# Patient Record
Sex: FEMALE | Race: WHITE
[De-identification: names, ages, dates, MRNs, and addresses within clinical notes are randomized per-mention and may not be internally consistent; named-entity substitution may affect disease eponyms.]

---

## 2019-07-17 ENCOUNTER — HOSPITAL ENCOUNTER (EMERGENCY)
Dept: HOSPITAL 91 - FTE | Age: 39
Discharge: HOME | End: 2019-07-17
Payer: COMMERCIAL

## 2019-07-17 ENCOUNTER — HOSPITAL ENCOUNTER (EMERGENCY)
Dept: HOSPITAL 10 - FTE | Age: 39
Discharge: HOME | End: 2019-07-17
Payer: COMMERCIAL

## 2019-07-17 VITALS — HEART RATE: 95 BPM | RESPIRATION RATE: 20 BRPM | DIASTOLIC BLOOD PRESSURE: 80 MMHG | SYSTOLIC BLOOD PRESSURE: 118 MMHG

## 2019-07-17 VITALS — WEIGHT: 145.31 LBS | HEIGHT: 55 IN | BODY MASS INDEX: 33.63 KG/M2

## 2019-07-17 DIAGNOSIS — N95.1: Primary | ICD-10-CM

## 2019-07-17 LAB
ADD MAN DIFF?: NO
ADD UMIC: YES
ALANINE AMINOTRANSFERASE: 14 IU/L (ref 13–69)
ALBUMIN/GLOBULIN RATIO: 1.24
ALBUMIN: 4.6 G/DL (ref 3.3–4.9)
ALKALINE PHOSPHATASE: 60 IU/L (ref 42–121)
ANION GAP: 11 (ref 5–13)
ASPARTATE AMINO TRANSFERASE: 18 IU/L (ref 15–46)
BASOPHIL #: 0 10^3/UL (ref 0–0.1)
BASOPHILS %: 0.6 % (ref 0–2)
BILIRUBIN,DIRECT: 0 MG/DL (ref 0–0.2)
BILIRUBIN,TOTAL: 1.4 MG/DL (ref 0.2–1.3)
BLOOD UREA NITROGEN: 4 MG/DL (ref 7–20)
CALCIUM: 10.1 MG/DL (ref 8.4–10.2)
CARBON DIOXIDE: 27 MMOL/L (ref 21–31)
CHLORIDE: 106 MMOL/L (ref 97–110)
CREATININE: 0.57 MG/DL (ref 0.44–1)
EOSINOPHILS #: 0.1 10^3/UL (ref 0–0.5)
EOSINOPHILS %: 1.1 % (ref 0–7)
GLOBULIN: 3.7 G/DL (ref 1.3–3.2)
GLUCOSE: 100 MG/DL (ref 70–220)
HEMATOCRIT: 38.3 % (ref 37–47)
HEMOGLOBIN: 12.5 G/DL (ref 12–16)
IMMATURE GRANS #M: 0.02 10^3/UL (ref 0–0.03)
IMMATURE GRANS % (M): 0.3 % (ref 0–0.43)
LIPASE: 72 U/L (ref 23–300)
LYMPHOCYTES #: 1.9 10^3/UL (ref 0.8–2.9)
LYMPHOCYTES %: 29.2 % (ref 15–51)
MEAN CORPUSCULAR HEMOGLOBIN: 29.3 PG (ref 29–33)
MEAN CORPUSCULAR HGB CONC: 32.6 G/DL (ref 32–37)
MEAN CORPUSCULAR VOLUME: 89.9 FL (ref 82–101)
MEAN PLATELET VOLUME: 9.9 FL (ref 7.4–10.4)
MONOCYTE #: 0.3 10^3/UL (ref 0.3–0.9)
MONOCYTES %: 5.1 % (ref 0–11)
NEUTROPHIL #: 4.1 10^3/UL (ref 1.6–7.5)
NEUTROPHILS %: 63.7 % (ref 39–77)
NUCLEATED RED BLOOD CELLS #: 0 10^3/UL (ref 0–0)
NUCLEATED RED BLOOD CELLS%: 0 /100WBC (ref 0–0)
PLATELET COUNT: 229 10^3/UL (ref 140–415)
POTASSIUM: 3.8 MMOL/L (ref 3.5–5.1)
RED BLOOD COUNT: 4.26 10^6/UL (ref 4.2–5.4)
RED CELL DISTRIBUTION WIDTH: 12.1 % (ref 11.5–14.5)
SODIUM: 144 MMOL/L (ref 135–144)
TOTAL PROTEIN: 8.3 G/DL (ref 6.1–8.1)
UR ASCORBIC ACID: NEGATIVE MG/DL
UR BILIRUBIN (DIP): NEGATIVE MG/DL
UR BLOOD (DIP): NEGATIVE MG/DL
UR CLARITY: (no result)
UR COLOR: YELLOW
UR GLUCOSE (DIP): NEGATIVE MG/DL
UR KETONES (DIP): NEGATIVE MG/DL
UR LEUKOCYTE ESTERASE (DIP): (no result) LEU/UL
UR MUCUS: (no result) /HPF
UR NITRITE (DIP): NEGATIVE MG/DL
UR PH (DIP): 5 (ref 5–9)
UR RBC: 2 /HPF (ref 0–5)
UR SPECIFIC GRAVITY (DIP): 1.01 (ref 1–1.03)
UR SQUAMOUS EPITHELIAL CELL: (no result) /HPF
UR TOTAL PROTEIN (DIP): NEGATIVE MG/DL
UR UROBILINOGEN (DIP): NEGATIVE MG/DL
UR WBC: 9 /HPF (ref 0–5)
WHITE BLOOD COUNT: 6.5 10^3/UL (ref 4.8–10.8)

## 2019-07-17 PROCEDURE — 81025 URINE PREGNANCY TEST: CPT

## 2019-07-17 PROCEDURE — 80053 COMPREHEN METABOLIC PANEL: CPT

## 2019-07-17 PROCEDURE — 36415 COLL VENOUS BLD VENIPUNCTURE: CPT

## 2019-07-17 PROCEDURE — 85025 COMPLETE CBC W/AUTO DIFF WBC: CPT

## 2019-07-17 PROCEDURE — 81001 URINALYSIS AUTO W/SCOPE: CPT

## 2019-07-17 PROCEDURE — 83690 ASSAY OF LIPASE: CPT

## 2019-07-17 PROCEDURE — 99283 EMERGENCY DEPT VISIT LOW MDM: CPT

## 2019-07-17 NOTE — ERD
ER Documentation


Chief Complaint


Chief Complaint





cc "hot flashes and sob"





HPI


38-year-old female presenting with hot flashes and shortness of breath.  Patient


states that she denies any chest pain.  Denies coughing.  LNMP 3 months ago.  Is


not sure if she is pregnant.  G6, .  Denies medical problems.  Irregular.  


OC denies OCP use.  Patient is requesting OCP to initiate her period.  Denies 


medical problems.  NKDA.  Surgical history on her nose.  Social history denies





ROS


All systems reviewed and are negative except as per history of present illness.





Medications


Home Meds


Active Scripts


Norethindrone AC-Eth Estradiol (Loestrin 21 1-20 Tablet) 1 Each Tablet, 1 EACH P


O DAILY, #30 TAB


   Prov:AILYN LINTON PA-C         19


Acetaminophen* (Tylenol*) 325 Mg Tablet, 2 TAB PO Q6 PRN for PAIN AND OR 


ELEVATED TEMP, #20 TAB


   Prov:AILYN LINTON PA-C         19


Ondansetron (Ondansetron Odt) 4 Mg Tab.rapdis, 4 MG PO Q6H PRN for NAUSEA AND/OR


VOMITING, #10 TAB


   Prov:AILYN LINTON PA-C         19





Allergies


Allergies:  


Coded Allergies:  


     No Known Allergy (Unverified , 19)





PMhx/Soc


Medical and Surgical Hx:  pt denies Medical Hx, pt denies Surgical Hx


History of Surgery:  No


Hx Neurological Disorder:  No


Hx Respiratory Disorders:  No


Hx Cardiac Disorders:  No


Hx Psychiatric Problems:  No


Hx Miscellaneous Medical Probl:  No


Hx Alcohol Use:  No


Hx Substance Use:  No


Hx Tobacco Use:  No


Smoking Status:  Never smoker





FmHx


Family History:  No diabetes, No coronary disease, No other





Physical Exam


Vitals





Vital Signs


  Date      Temp  Pulse  Resp  B/P (MAP)   Pulse Ox  O2          O2 Flow    FiO2


Time                                                 Delivery    Rate


   19  98.0     95    20      118/80       100


     08:41                           (93)





Physical Exam


GENERAL: The patient is well-appearing, well-nourished, in no acute distres


CHEST: Clear to auscultation bilaterally.  There are no rales, wheezes or 


rhonchi.


HEART: Regular rate and rhythm.  No murmurs, clicks, rubs or gallops.  No S3 or 


S4.


ABDOMEN:Soft, nontender and nondistended.  Good bowel sounds.  No rebound or 


guarding.  No gross peritonitis.  No gross organomegaly or masses.  


NEUROLOGIC: Alert and oriented.  Cranial nerves II through XII intact.  Motor 


strength in all 4 extremities with 5 out of 5 strength.  Sensation grossly 


intact.  Normal speech and gait. 


SKIN: There is no apparent rash or petechiae.  The skin is warm and dry.


Result Diagram:  


19





Results 24 hrs





Laboratory Tests


Test
                               19
09:26   19
09:30  19
09:32


Urine Color                       YELLOW


Urine Clarity
                    SLIGHTLY
CLOUDY  
               



Urine pH                                     5.0


Urine Specific Gravity                     1.012


Urine Ketones                     NEGATIVE mg/dL


Urine Nitrite                     NEGATIVE mg/dL


Urine Bilirubin                   NEGATIVE mg/dL


Urine Urobilinogen                NEGATIVE mg/dL


Urine Leukocyte Esterase               1+ Clark/ul


Urine Microscopic RBC                     2 /HPF


Urine Microscopic WBC                     9 /HPF


Urine Squamous Epithelial
Cells   FEW /HPF 
       
               



Urine Mucus                       MODERATE /HPF


Urine Hemoglobin                  NEGATIVE mg/dL


Urine Glucose                     NEGATIVE mg/dL


Urine Total Protein               NEGATIVE mg/dl


White Blood Count                                    6.5 10^3/ul


Red Blood Count                                     4.26 10^6/ul


Hemoglobin                                             12.5 g/dl


Hematocrit                                                38.3 %


Mean Corpuscular Volume                                  89.9 fl


Mean Corpuscular Hemoglobin                              29.3 pg


Mean Corpuscular                  
                   32.6 g/dl 
  



Hemoglobin
Concent


Red Cell Distribution Width                               12.1 %


Platelet Count                                       229 10^3/UL


Mean Platelet Volume                                      9.9 fl


Immature Granulocytes %                                  0.300 %


Neutrophils %                                             63.7 %


Lymphocytes %                                             29.2 %


Monocytes %                                                5.1 %


Eosinophils %                                              1.1 %


Basophils %                                                0.6 %


Nucleated Red Blood Cells %                          0.0 /100WBC


Immature Granulocytes #                            0.020 10^3/ul


Neutrophils #                                        4.1 10^3/ul


Lymphocytes #                                        1.9 10^3/ul


Monocytes #                                          0.3 10^3/ul


Eosinophils #                                        0.1 10^3/ul


Basophils #                                          0.0 10^3/ul


Nucleated Red Blood Cells #                          0.0 10^3/ul


Sodium Level                                          144 mmol/L


Potassium Level                                       3.8 mmol/L


Chloride Level                                        106 mmol/L


Carbon Dioxide Level                                   27 mmol/L


Anion Gap                                                     11


Blood Urea Nitrogen                                      4 mg/dl


Creatinine                                            0.57 mg/dl


Est Glomerular Filtrat            
                > 60 mL/min 
   



Rate
mL/min


Glucose Level                                          100 mg/dl


Calcium Level                                         10.1 mg/dl


Total Bilirubin                                        1.4 mg/dl


Direct Bilirubin                                      0.00 mg/dl


Indirect Bilirubin                                     1.4 mg/dl


Aspartate Amino                   
                     18 IU/L 
  



Transf
(AST/SGOT)


Alanine                           
                     14 IU/L 
  



Aminotransferase
(ALT/SGPT)


Alkaline Phosphatase                                     60 IU/L


Total Protein                                           8.3 g/dl


Albumin                                                 4.6 g/dl


Globulin                                               3.70 g/dl


Albumin/Globulin Ratio                                      1.24


Lipase                                                    72 U/L


POC Beta HCG, Qualitative                                          NEGATIVE








Procedures/MDM


MDM: 38-year-old female presenting with questions.  I have low suspicion for 


cardiac or pulmonary emergency.  I have low suspicion for elective imbalance.  A


low suspicion for urinary tract infection.  I have low suspicion for pelvic or 


abdominal complication.  Patient is discharged with strict ER precautions and 


told to follow-up with primary care within 1 to 2 days for close evaluation.  


Patient is told symptoms change or worsen to return immediately to the ER.  All 


questions answered at discharge





Departure


Diagnosis:  


   Primary Impression:  


   Hot flashes


Condition:  Stable


Patient Instructions:  Abdominal Pain, Unknown Cause, (Female)


Referrals:  


[facility]


YOU HAVE RECEIVED A MEDICAL SCREENING EXAM AND THE RESULTS INDICATE THAT YOU DO 


NOT HAVE A CONDITION THAT REQUIRES URGENT TREATMENT IN THE EMERGENCY DEPARTMENT.





FURTHER EVALUATION AND TREATMENT OF YOUR CONDITION CAN WAIT UNTIL YOU ARE SEEN 


IN YOUR DOCTORS OFFICE WITHIN THE NEXT 1-2 DAYS. IT IS YOUR RESPONSIBILITY TO 


MAKE AN APPOINTMENT FOR FOLOW-UP CARE.





IF YOU HAVE A PRIMARY DOCTOR


--you should call your primary doctor and schedule an appointment





IF YOU DO NOT HAVE A PRIMARY DOCTOR YOU CAN CALL OUR PHYSICIAN REFERRAL HOTLINE 


AT


 [phone] 





IF YOU CAN NOT AFFORD TO SEE A PHYSICIAN YOU CAN CHOSE FROM THE FOLLOWING 


[facility]





[facility] [phone] [address] [facility] [phone] [address] [facility]. [facility] [phone] [facility]. [facility] [phone] [address] [facility]. [facility] [phone] [address] [facility]. [facility] [phone]


[address]





Additional Instructions:  


FOLLOW UP WITH YOUR PRIMARY CARE PHYSICIAN TOMORROW.Return to this facility if 


you are not improving as expected.











AILYN LINTON PA-C       2019 14:37

## 2019-08-04 ENCOUNTER — HOSPITAL ENCOUNTER (EMERGENCY)
Dept: HOSPITAL 10 - FTE | Age: 39
Discharge: HOME | End: 2019-08-04
Payer: COMMERCIAL

## 2019-08-04 ENCOUNTER — HOSPITAL ENCOUNTER (EMERGENCY)
Dept: HOSPITAL 91 - FTE | Age: 39
Discharge: HOME | End: 2019-08-04
Payer: COMMERCIAL

## 2019-08-04 VITALS
BODY MASS INDEX: 23.95 KG/M2 | WEIGHT: 149.03 LBS | HEIGHT: 66 IN | WEIGHT: 149.03 LBS | BODY MASS INDEX: 23.95 KG/M2 | HEIGHT: 66 IN

## 2019-08-04 VITALS — HEART RATE: 72 BPM | DIASTOLIC BLOOD PRESSURE: 69 MMHG | SYSTOLIC BLOOD PRESSURE: 110 MMHG | RESPIRATION RATE: 18 BRPM

## 2019-08-04 DIAGNOSIS — R23.2: Primary | ICD-10-CM

## 2019-08-04 PROCEDURE — 99282 EMERGENCY DEPT VISIT SF MDM: CPT

## 2019-08-04 PROCEDURE — 81025 URINE PREGNANCY TEST: CPT

## 2019-08-04 NOTE — ERD
ER Documentation


Chief Complaint


Chief Complaint





abdominal pain with hot flushes since on and off since may 16,2019.





HPI


Patient is a 38 years old female with no known past medical history presenting 


to the clinic for persistent hot flashes and mild abdominal cramps occasional 


nausea and vomiting since May 16, 2019.  She was seen and evaluated for similar 


symptoms on July 17, 2019.  Patient was given OCP and was advised to follow-up 


with her primary care and OB/GYN.  She denies following up with her PCP or 


OB/GYN.  Patient is requesting urine pregnancy test that she is still concerned 


about possible pregnancy.





ROS


All systems reviewed and are negative except as per history of present illness.





Medications


Home Meds


Active Scripts


Norethindrone AC-Eth Estradiol (Loestrin 21 1-20 Tablet) 1 Each Tablet, 1 EACH 


PO DAILY, #30 TAB


   Prov:AILYN LINTON PA-C         7/17/19


Acetaminophen* (Tylenol*) 325 Mg Tablet, 2 TAB PO Q6 PRN for PAIN AND OR 


ELEVATED TEMP, #20 TAB


   Prov:AILYN LINTON PA-C         7/17/19


Ondansetron (Ondansetron Odt) 4 Mg Tab.rapdis, 4 MG PO Q6H PRN for NAUSEA AND/OR


VOMITING, #10 TAB


   Prov:AILYN LINTON PA-C         7/17/19





Allergies


Allergies:  


Coded Allergies:  


     No Known Allergy (Unverified , 7/17/19)





PMhx/Soc


Medical and Surgical Hx:  pt denies Medical Hx, pt denies Surgical Hx


History of Surgery:  No


Hx Neurological Disorder:  No


Hx Respiratory Disorders:  No


Hx Cardiac Disorders:  No


Hx Psychiatric Problems:  No


Hx Miscellaneous Medical Probl:  No


Hx Alcohol Use:  No


Hx Substance Use:  No


Hx Tobacco Use:  No


Smoking Status:  Never smoker





Physical Exam


Vitals





Vital Signs


  Date      Temp  Pulse  Resp  B/P (MAP)   Pulse Ox  O2          O2 Flow    FiO2


Time                                                 Delivery    Rate


    8/4/19  98.7     72    18      110/69        99


     02:04                           (83)


    8/4/19  97.7     79    16      140/80        99


     00:23                          (100)





Physical Exam


Const:   No acute distress


Head:   Atraumatic 


Resp:   Clear to auscultation bilaterally


Cardio:   Regular rate and rhythm, no murmurs


Abd:    Soft, non tender, non distended. Normal bowel sounds


Psych:    Normal Mood and Affect


Results 24 hrs





Laboratory Tests


                    Test
                      8/4/19
01:51


                    POC Beta HCG, Qualitative  NEGATIVE








Procedures/MDM


Patient was seen and evaluated for persistent hot flashes.  Patient currently 


denies any vomiting or nausea episodes as of today not require any further work-


up.





Urine pregnancy negative.





Patient is stable ready for discharge.  Follow-up with your PCP and OB/GYN.





Departure


Diagnosis:  


   Primary Impression:  


   Hot flashes


Condition:  Stable


Patient Instructions:  For Women: Deciding About Hormone Therapy (HT)


Referrals:  


[facility]





Additional Instructions:  


Patient advised to return to the ED immediately for new or worsening symptoms. 


Patient advised to follow up with primary care provider in the next 24-48 hours.


Patient verbalized understanding and agrees with treatment plan and course of 


action.














If patient has no primary care they may follow up with














[facility]





[address] [place] [id number]














or














[facility]





[id number] [place] [id number]














or














[facility]





[address] [place] [address]











ELIS MCRAE PA-C                Aug 4, 2019 01:17

## 2019-09-12 ENCOUNTER — HOSPITAL ENCOUNTER (EMERGENCY)
Dept: HOSPITAL 91 - FTE | Age: 39
Discharge: HOME | End: 2019-09-12
Payer: COMMERCIAL

## 2019-09-12 DIAGNOSIS — F41.9: Primary | ICD-10-CM

## 2019-09-12 PROCEDURE — 99283 EMERGENCY DEPT VISIT LOW MDM: CPT

## 2019-09-12 PROCEDURE — 81025 URINE PREGNANCY TEST: CPT

## 2019-09-12 RX ADMIN — LORAZEPAM 1 MG: 0.5 TABLET ORAL at 16:36
